# Patient Record
Sex: MALE | Race: WHITE | Employment: OTHER | URBAN - METROPOLITAN AREA
[De-identification: names, ages, dates, MRNs, and addresses within clinical notes are randomized per-mention and may not be internally consistent; named-entity substitution may affect disease eponyms.]

---

## 2023-07-28 ENCOUNTER — HOSPITAL ENCOUNTER (OUTPATIENT)
Facility: HOSPITAL | Age: 78
End: 2023-07-28
Payer: MEDICARE

## 2023-07-28 VITALS — WEIGHT: 170.7 LBS | BODY MASS INDEX: 28.44 KG/M2 | HEIGHT: 65 IN

## 2023-07-28 DIAGNOSIS — R31.0 GROSS HEMATURIA: ICD-10-CM

## 2023-07-28 LAB
ALBUMIN SERPL-MCNC: 3.6 G/DL (ref 3.4–5)
ALBUMIN/GLOB SERPL: 1.2 (ref 0.8–1.7)
ALP SERPL-CCNC: 87 U/L (ref 45–117)
ALT SERPL-CCNC: 25 U/L (ref 16–61)
ANION GAP SERPL CALC-SCNC: 4 MMOL/L (ref 3–18)
AST SERPL-CCNC: 13 U/L (ref 10–38)
BILIRUB SERPL-MCNC: 0.4 MG/DL (ref 0.2–1)
BUN SERPL-MCNC: 15 MG/DL (ref 7–18)
BUN/CREAT SERPL: 17 (ref 12–20)
CALCIUM SERPL-MCNC: 9.1 MG/DL (ref 8.5–10.1)
CHLORIDE SERPL-SCNC: 97 MMOL/L (ref 100–111)
CO2 SERPL-SCNC: 27 MMOL/L (ref 21–32)
CREAT SERPL-MCNC: 0.88 MG/DL (ref 0.6–1.3)
ERYTHROCYTE [DISTWIDTH] IN BLOOD BY AUTOMATED COUNT: 13.1 % (ref 11.6–14.5)
GLOBULIN SER CALC-MCNC: 2.9 G/DL (ref 2–4)
GLUCOSE SERPL-MCNC: 103 MG/DL (ref 74–99)
HCT VFR BLD AUTO: 41 % (ref 36–48)
HGB BLD-MCNC: 13.9 G/DL (ref 13–16)
MCH RBC QN AUTO: 30.8 PG (ref 24–34)
MCHC RBC AUTO-ENTMCNC: 33.9 G/DL (ref 31–37)
MCV RBC AUTO: 90.9 FL (ref 78–100)
NRBC # BLD: 0 K/UL (ref 0–0.01)
NRBC BLD-RTO: 0 PER 100 WBC
PLATELET # BLD AUTO: 291 K/UL (ref 135–420)
PMV BLD AUTO: 8.3 FL (ref 9.2–11.8)
POTASSIUM SERPL-SCNC: 4.5 MMOL/L (ref 3.5–5.5)
PROT SERPL-MCNC: 6.5 G/DL (ref 6.4–8.2)
RBC # BLD AUTO: 4.51 M/UL (ref 4.35–5.65)
SODIUM SERPL-SCNC: 128 MMOL/L (ref 136–145)
WBC # BLD AUTO: 10.3 K/UL (ref 4.6–13.2)

## 2023-07-28 PROCEDURE — 93005 ELECTROCARDIOGRAM TRACING: CPT | Performed by: UROLOGY

## 2023-07-28 PROCEDURE — 80053 COMPREHEN METABOLIC PANEL: CPT

## 2023-07-28 PROCEDURE — 36415 COLL VENOUS BLD VENIPUNCTURE: CPT

## 2023-07-28 PROCEDURE — 85027 COMPLETE CBC AUTOMATED: CPT

## 2023-07-29 LAB
EKG ATRIAL RATE: 81 BPM
EKG DIAGNOSIS: NORMAL
EKG P AXIS: 66 DEGREES
EKG P-R INTERVAL: 182 MS
EKG Q-T INTERVAL: 384 MS
EKG QRS DURATION: 92 MS
EKG QTC CALCULATION (BAZETT): 446 MS
EKG R AXIS: 11 DEGREES
EKG T AXIS: 81 DEGREES
EKG VENTRICULAR RATE: 81 BPM

## 2023-08-02 ENCOUNTER — HOSPITAL ENCOUNTER (OUTPATIENT)
Facility: HOSPITAL | Age: 78
Setting detail: OUTPATIENT SURGERY
Discharge: HOME | End: 2023-08-02
Attending: UROLOGY | Admitting: UROLOGY
Payer: MEDICARE

## 2023-08-02 VITALS
SYSTOLIC BLOOD PRESSURE: 192 MMHG | RESPIRATION RATE: 16 BRPM | WEIGHT: 170.3 LBS | TEMPERATURE: 98.5 F | OXYGEN SATURATION: 99 % | HEART RATE: 51 BPM | BODY MASS INDEX: 27.37 KG/M2 | HEIGHT: 66 IN | DIASTOLIC BLOOD PRESSURE: 107 MMHG

## 2023-08-02 DIAGNOSIS — Z01.818 PRE-OP TESTING: Primary | ICD-10-CM

## 2023-08-02 LAB
ANION GAP SERPL CALC-SCNC: 6 MMOL/L (ref 3–18)
BUN SERPL-MCNC: 12 MG/DL (ref 7–18)
BUN/CREAT SERPL: 15 (ref 12–20)
CALCIUM SERPL-MCNC: 9.2 MG/DL (ref 8.5–10.1)
CHLORIDE SERPL-SCNC: 102 MMOL/L (ref 100–111)
CO2 SERPL-SCNC: 28 MMOL/L (ref 21–32)
CREAT SERPL-MCNC: 0.8 MG/DL (ref 0.6–1.3)
GLUCOSE SERPL-MCNC: 117 MG/DL (ref 74–99)
POTASSIUM SERPL-SCNC: 4.5 MMOL/L (ref 3.5–5.5)
SODIUM SERPL-SCNC: 136 MMOL/L (ref 136–145)

## 2023-08-02 PROCEDURE — 80048 BASIC METABOLIC PNL TOTAL CA: CPT

## 2023-08-02 PROCEDURE — 36415 COLL VENOUS BLD VENIPUNCTURE: CPT

## 2023-08-02 RX ORDER — LEVOFLOXACIN 5 MG/ML
500 INJECTION, SOLUTION INTRAVENOUS
Status: DISCONTINUED | OUTPATIENT
Start: 2023-08-02 | End: 2023-08-02 | Stop reason: HOSPADM

## 2023-08-02 RX ORDER — SODIUM CHLORIDE, SODIUM LACTATE, POTASSIUM CHLORIDE, CALCIUM CHLORIDE 600; 310; 30; 20 MG/100ML; MG/100ML; MG/100ML; MG/100ML
INJECTION, SOLUTION INTRAVENOUS CONTINUOUS
Status: CANCELLED | OUTPATIENT
Start: 2023-08-02

## 2023-08-02 RX ORDER — SODIUM CHLORIDE, SODIUM LACTATE, POTASSIUM CHLORIDE, CALCIUM CHLORIDE 600; 310; 30; 20 MG/100ML; MG/100ML; MG/100ML; MG/100ML
INJECTION, SOLUTION INTRAVENOUS CONTINUOUS
Status: DISCONTINUED | OUTPATIENT
Start: 2023-08-02 | End: 2023-08-02 | Stop reason: HOSPADM

## 2023-08-02 RX ORDER — SODIUM CHLORIDE 0.9 % (FLUSH) 0.9 %
5-40 SYRINGE (ML) INJECTION PRN
Status: CANCELLED | OUTPATIENT
Start: 2023-08-02

## 2023-08-02 RX ORDER — DIPHENHYDRAMINE HYDROCHLORIDE 50 MG/ML
12.5 INJECTION INTRAMUSCULAR; INTRAVENOUS
Status: CANCELLED | OUTPATIENT
Start: 2023-08-02 | End: 2023-08-03

## 2023-08-02 RX ORDER — SODIUM CHLORIDE 0.9 % (FLUSH) 0.9 %
5-40 SYRINGE (ML) INJECTION EVERY 12 HOURS SCHEDULED
Status: CANCELLED | OUTPATIENT
Start: 2023-08-02

## 2023-08-02 RX ORDER — MIDAZOLAM HYDROCHLORIDE 2 MG/2ML
2 INJECTION, SOLUTION INTRAMUSCULAR; INTRAVENOUS
Status: CANCELLED | OUTPATIENT
Start: 2023-08-02 | End: 2023-08-03

## 2023-08-02 RX ORDER — ONDANSETRON 2 MG/ML
4 INJECTION INTRAMUSCULAR; INTRAVENOUS
Status: CANCELLED | OUTPATIENT
Start: 2023-08-02 | End: 2023-08-03

## 2023-08-02 RX ORDER — DROPERIDOL 2.5 MG/ML
0.62 INJECTION, SOLUTION INTRAMUSCULAR; INTRAVENOUS
Status: CANCELLED | OUTPATIENT
Start: 2023-08-02 | End: 2023-08-03

## 2023-08-02 RX ORDER — SODIUM CHLORIDE 9 MG/ML
INJECTION, SOLUTION INTRAVENOUS PRN
Status: CANCELLED | OUTPATIENT
Start: 2023-08-02

## 2023-08-02 RX ORDER — FENTANYL CITRATE 50 UG/ML
25 INJECTION, SOLUTION INTRAMUSCULAR; INTRAVENOUS EVERY 5 MIN PRN
Status: CANCELLED | OUTPATIENT
Start: 2023-08-02

## 2023-08-02 RX ORDER — HYDROMORPHONE HYDROCHLORIDE 1 MG/ML
0.25 INJECTION, SOLUTION INTRAMUSCULAR; INTRAVENOUS; SUBCUTANEOUS EVERY 5 MIN PRN
Status: CANCELLED | OUTPATIENT
Start: 2023-08-02

## 2023-08-02 RX ORDER — LABETALOL HYDROCHLORIDE 5 MG/ML
10 INJECTION, SOLUTION INTRAVENOUS
Status: CANCELLED | OUTPATIENT
Start: 2023-08-02

## 2023-08-02 ASSESSMENT — PAIN - FUNCTIONAL ASSESSMENT: PAIN_FUNCTIONAL_ASSESSMENT: 0-10

## 2023-08-02 NOTE — H&P
Date    ascorbic acid (vitamin C) 500 mg tablet    N          B Complex Plus Vitamin C 15 mg-10 mg-50 mg-5 mg-300 mg capsule    N          cod liver oil capsule    N          famotidine 20 mg tablet  take 1 tablet by oral route 2 times every day  N          Flomax  take 1 capsule by oral route  every day 1/2 hour following the same meal each day  N          Flonase Allergy Relief 50 mcg/actuation nasal spray,suspension  spray 1 - 2 spray by intranasal route  every day in each nostril as needed  N        03/21/2023  levothyroxine 125 mcg tablet  take 1 tablet by oral route  every day on an empty stomach 30 minutes before breakfast  N          loratadine 10 mg tablet  take 1 tablet by oral route  every day  N          losartan 50 mg-hydrochlorothiazide 12.5 mg tablet  take 1 tablet by oral route  every day  N          tramadol 50 mg tablet  take 1 tablet by oral route  every 6 hours as needed  N          Vitamin D3 50 mcg (2,000 unit) capsule    N          vitamin E 400 unit capsule    N          zinc 50 mg tablet    N            Active Patient Care Team Members  Name  Contact  Agency Type  Support Role  Relationship  Active Date  Inactive Date  Specialty  Paulina Deutsch      Patient provider  PCP

## 2023-08-02 NOTE — PERIOP NOTE
Spoke with Dr. Pietro Goltz to notify him the patients sodium is normal but his blood pressures are elevated. States he is going to speak with Dr. Anjali Man and get back to me. [facility] called and stated Dr. Anjali Man and Dr. Pietro Goltz stated he needs to follow up with his PCP in regards to his blood pressure and have it better controlled for surgery. Call doctors office to reschedule.

## 2023-08-02 NOTE — PERIOP NOTE
Spoke with Dr. Ronit Eric as patients Sodium was 128 on labs pre surgery. Stated to repeat today.  Order placed,

## 2023-08-18 NOTE — PROGRESS NOTES
Requested a [employer] with [facility]. Surgery 09/06/23 Dr. Priyanka Camejo. Faxed back to [facility] his Surgery Scheduler.

## 2023-09-05 ENCOUNTER — ANESTHESIA EVENT (OUTPATIENT)
Facility: HOSPITAL | Age: 78
End: 2023-09-05
Payer: MEDICARE

## 2023-09-06 ENCOUNTER — ANESTHESIA (OUTPATIENT)
Facility: HOSPITAL | Age: 78
End: 2023-09-06
Payer: MEDICARE

## 2023-09-06 ENCOUNTER — HOSPITAL ENCOUNTER (OUTPATIENT)
Facility: HOSPITAL | Age: 78
Setting detail: OUTPATIENT SURGERY
Discharge: HOME | End: 2023-09-06
Attending: UROLOGY | Admitting: UROLOGY
Payer: MEDICARE

## 2023-09-06 VITALS
RESPIRATION RATE: 16 BRPM | TEMPERATURE: 97.6 F | WEIGHT: 167 LBS | HEART RATE: 87 BPM | HEIGHT: 66 IN | OXYGEN SATURATION: 98 % | DIASTOLIC BLOOD PRESSURE: 81 MMHG | BODY MASS INDEX: 26.84 KG/M2 | SYSTOLIC BLOOD PRESSURE: 149 MMHG

## 2023-09-06 DIAGNOSIS — N32.89 BLADDER MASS: Primary | ICD-10-CM

## 2023-09-06 PROCEDURE — 7100000000 HC PACU RECOVERY - FIRST 15 MIN: Performed by: UROLOGY

## 2023-09-06 PROCEDURE — 3600000012 HC SURGERY LEVEL 2 ADDTL 15MIN: Performed by: UROLOGY

## 2023-09-06 PROCEDURE — 2720000010 HC SURG SUPPLY STERILE: Performed by: UROLOGY

## 2023-09-06 PROCEDURE — 2709999900 HC NON-CHARGEABLE SUPPLY: Performed by: UROLOGY

## 2023-09-06 PROCEDURE — 7100000001 HC PACU RECOVERY - ADDTL 15 MIN: Performed by: UROLOGY

## 2023-09-06 PROCEDURE — 88307 TISSUE EXAM BY PATHOLOGIST: CPT

## 2023-09-06 PROCEDURE — 6360000002 HC RX W HCPCS: Performed by: NURSE ANESTHETIST, CERTIFIED REGISTERED

## 2023-09-06 PROCEDURE — 3700000001 HC ADD 15 MINUTES (ANESTHESIA): Performed by: UROLOGY

## 2023-09-06 PROCEDURE — 2500000003 HC RX 250 WO HCPCS: Performed by: NURSE ANESTHETIST, CERTIFIED REGISTERED

## 2023-09-06 PROCEDURE — 7100000010 HC PHASE II RECOVERY - FIRST 15 MIN: Performed by: UROLOGY

## 2023-09-06 PROCEDURE — 7100000011 HC PHASE II RECOVERY - ADDTL 15 MIN: Performed by: UROLOGY

## 2023-09-06 PROCEDURE — C9399 UNCLASSIFIED DRUGS OR BIOLOG: HCPCS | Performed by: NURSE ANESTHETIST, CERTIFIED REGISTERED

## 2023-09-06 PROCEDURE — 3700000000 HC ANESTHESIA ATTENDED CARE: Performed by: UROLOGY

## 2023-09-06 PROCEDURE — 6360000002 HC RX W HCPCS: Performed by: UROLOGY

## 2023-09-06 PROCEDURE — 2580000003 HC RX 258: Performed by: UROLOGY

## 2023-09-06 PROCEDURE — 88309 TISSUE EXAM BY PATHOLOGIST: CPT

## 2023-09-06 PROCEDURE — 3600000002 HC SURGERY LEVEL 2 BASE: Performed by: UROLOGY

## 2023-09-06 RX ORDER — SODIUM CHLORIDE 0.9 % (FLUSH) 0.9 %
5-40 SYRINGE (ML) INJECTION PRN
Status: DISCONTINUED | OUTPATIENT
Start: 2023-09-06 | End: 2023-09-06 | Stop reason: HOSPADM

## 2023-09-06 RX ORDER — PROPOFOL 10 MG/ML
INJECTION, EMULSION INTRAVENOUS PRN
Status: DISCONTINUED | OUTPATIENT
Start: 2023-09-06 | End: 2023-09-06 | Stop reason: SDUPTHER

## 2023-09-06 RX ORDER — DOCUSATE SODIUM 100 MG/1
100 CAPSULE, LIQUID FILLED ORAL 2 TIMES DAILY
Qty: 20 CAPSULE | Refills: 0 | Status: SHIPPED | OUTPATIENT
Start: 2023-09-06 | End: 2023-09-16

## 2023-09-06 RX ORDER — LEVOFLOXACIN 5 MG/ML
500 INJECTION, SOLUTION INTRAVENOUS
Status: COMPLETED | OUTPATIENT
Start: 2023-09-06 | End: 2023-09-06

## 2023-09-06 RX ORDER — SODIUM CHLORIDE 9 MG/ML
INJECTION, SOLUTION INTRAVENOUS PRN
Status: DISCONTINUED | OUTPATIENT
Start: 2023-09-06 | End: 2023-09-06 | Stop reason: HOSPADM

## 2023-09-06 RX ORDER — LEVOFLOXACIN 500 MG/1
500 TABLET, FILM COATED ORAL DAILY
Qty: 3 TABLET | Refills: 0 | Status: SHIPPED | OUTPATIENT
Start: 2023-09-06 | End: 2023-09-09

## 2023-09-06 RX ORDER — HYDROMORPHONE HYDROCHLORIDE 1 MG/ML
0.5 INJECTION, SOLUTION INTRAMUSCULAR; INTRAVENOUS; SUBCUTANEOUS EVERY 5 MIN PRN
Status: DISCONTINUED | OUTPATIENT
Start: 2023-09-06 | End: 2023-09-06 | Stop reason: HOSPADM

## 2023-09-06 RX ORDER — ROCURONIUM BROMIDE 10 MG/ML
INJECTION, SOLUTION INTRAVENOUS PRN
Status: DISCONTINUED | OUTPATIENT
Start: 2023-09-06 | End: 2023-09-06 | Stop reason: SDUPTHER

## 2023-09-06 RX ORDER — FENTANYL CITRATE 50 UG/ML
25 INJECTION, SOLUTION INTRAMUSCULAR; INTRAVENOUS EVERY 5 MIN PRN
Status: DISCONTINUED | OUTPATIENT
Start: 2023-09-06 | End: 2023-09-06 | Stop reason: HOSPADM

## 2023-09-06 RX ORDER — SODIUM CHLORIDE, SODIUM LACTATE, POTASSIUM CHLORIDE, CALCIUM CHLORIDE 600; 310; 30; 20 MG/100ML; MG/100ML; MG/100ML; MG/100ML
INJECTION, SOLUTION INTRAVENOUS CONTINUOUS
Status: DISCONTINUED | OUTPATIENT
Start: 2023-09-06 | End: 2023-09-06 | Stop reason: HOSPADM

## 2023-09-06 RX ORDER — IPRATROPIUM BROMIDE AND ALBUTEROL SULFATE 2.5; .5 MG/3ML; MG/3ML
1 SOLUTION RESPIRATORY (INHALATION)
Status: DISCONTINUED | OUTPATIENT
Start: 2023-09-06 | End: 2023-09-06 | Stop reason: HOSPADM

## 2023-09-06 RX ORDER — SODIUM CHLORIDE 0.9 % (FLUSH) 0.9 %
5-40 SYRINGE (ML) INJECTION EVERY 12 HOURS SCHEDULED
Status: DISCONTINUED | OUTPATIENT
Start: 2023-09-06 | End: 2023-09-06 | Stop reason: HOSPADM

## 2023-09-06 RX ORDER — FENTANYL CITRATE 50 UG/ML
INJECTION, SOLUTION INTRAMUSCULAR; INTRAVENOUS PRN
Status: DISCONTINUED | OUTPATIENT
Start: 2023-09-06 | End: 2023-09-06 | Stop reason: SDUPTHER

## 2023-09-06 RX ORDER — DEXAMETHASONE SODIUM PHOSPHATE 4 MG/ML
INJECTION, SOLUTION INTRA-ARTICULAR; INTRALESIONAL; INTRAMUSCULAR; INTRAVENOUS; SOFT TISSUE PRN
Status: DISCONTINUED | OUTPATIENT
Start: 2023-09-06 | End: 2023-09-06 | Stop reason: SDUPTHER

## 2023-09-06 RX ORDER — EPHEDRINE SULFATE/0.9% NACL/PF 50 MG/5 ML
SYRINGE (ML) INTRAVENOUS PRN
Status: DISCONTINUED | OUTPATIENT
Start: 2023-09-06 | End: 2023-09-06 | Stop reason: SDUPTHER

## 2023-09-06 RX ORDER — ONDANSETRON 2 MG/ML
INJECTION INTRAMUSCULAR; INTRAVENOUS PRN
Status: DISCONTINUED | OUTPATIENT
Start: 2023-09-06 | End: 2023-09-06 | Stop reason: SDUPTHER

## 2023-09-06 RX ORDER — PROCHLORPERAZINE EDISYLATE 5 MG/ML
5 INJECTION INTRAMUSCULAR; INTRAVENOUS
Status: DISCONTINUED | OUTPATIENT
Start: 2023-09-06 | End: 2023-09-06 | Stop reason: HOSPADM

## 2023-09-06 RX ORDER — OXYCODONE HYDROCHLORIDE AND ACETAMINOPHEN 5; 325 MG/1; MG/1
1 TABLET ORAL EVERY 6 HOURS PRN
Qty: 20 TABLET | Refills: 0 | Status: SHIPPED | OUTPATIENT
Start: 2023-09-06 | End: 2023-09-11

## 2023-09-06 RX ORDER — LIDOCAINE HYDROCHLORIDE 20 MG/ML
INJECTION, SOLUTION INTRAVENOUS PRN
Status: DISCONTINUED | OUTPATIENT
Start: 2023-09-06 | End: 2023-09-06 | Stop reason: SDUPTHER

## 2023-09-06 RX ORDER — ACETAMINOPHEN 500 MG
1000 TABLET ORAL EVERY 6 HOURS PRN
COMMUNITY

## 2023-09-06 RX ORDER — PHENAZOPYRIDINE HYDROCHLORIDE 200 MG/1
200 TABLET, FILM COATED ORAL
Qty: 15 TABLET | Refills: 0 | Status: SHIPPED | OUTPATIENT
Start: 2023-09-06 | End: 2023-09-11

## 2023-09-06 RX ADMIN — Medication 10 MG: at 12:20

## 2023-09-06 RX ADMIN — LEVOFLOXACIN 500 MG: 5 INJECTION, SOLUTION INTRAVENOUS at 11:27

## 2023-09-06 RX ADMIN — Medication 5 MG: at 11:50

## 2023-09-06 RX ADMIN — LIDOCAINE HYDROCHLORIDE 100 MG: 20 INJECTION, SOLUTION INTRAVENOUS at 11:30

## 2023-09-06 RX ADMIN — Medication 5 MG: at 12:10

## 2023-09-06 RX ADMIN — Medication 10 MG: at 12:13

## 2023-09-06 RX ADMIN — Medication 5 MG: at 12:02

## 2023-09-06 RX ADMIN — Medication 10 MG: at 11:59

## 2023-09-06 RX ADMIN — ROCURONIUM BROMIDE 5 MG: 10 INJECTION, SOLUTION INTRAVENOUS at 12:27

## 2023-09-06 RX ADMIN — FENTANYL CITRATE 25 MCG: 50 INJECTION, SOLUTION INTRAMUSCULAR; INTRAVENOUS at 11:30

## 2023-09-06 RX ADMIN — FENTANYL CITRATE 25 MCG: 50 INJECTION, SOLUTION INTRAMUSCULAR; INTRAVENOUS at 12:07

## 2023-09-06 RX ADMIN — SODIUM CHLORIDE, SODIUM LACTATE, POTASSIUM CHLORIDE, AND CALCIUM CHLORIDE: 600; 310; 30; 20 INJECTION, SOLUTION INTRAVENOUS at 11:27

## 2023-09-06 RX ADMIN — PROPOFOL 130 MG: 10 INJECTION, EMULSION INTRAVENOUS at 11:32

## 2023-09-06 RX ADMIN — Medication 5 MG: at 11:58

## 2023-09-06 RX ADMIN — Medication 10 MG: at 11:46

## 2023-09-06 RX ADMIN — SUGAMMADEX 100 MG: 100 INJECTION, SOLUTION INTRAVENOUS at 12:56

## 2023-09-06 RX ADMIN — FENTANYL CITRATE 25 MCG: 50 INJECTION, SOLUTION INTRAMUSCULAR; INTRAVENOUS at 11:41

## 2023-09-06 RX ADMIN — FENTANYL CITRATE 25 MCG: 50 INJECTION, SOLUTION INTRAMUSCULAR; INTRAVENOUS at 11:53

## 2023-09-06 RX ADMIN — DEXAMETHASONE SODIUM PHOSPHATE 4 MG: 4 INJECTION INTRA-ARTICULAR; INTRALESIONAL; INTRAMUSCULAR; INTRAVENOUS; SOFT TISSUE at 11:51

## 2023-09-06 RX ADMIN — ONDANSETRON 4 MG: 2 INJECTION INTRAMUSCULAR; INTRAVENOUS at 12:55

## 2023-09-06 RX ADMIN — ROCURONIUM BROMIDE 10 MG: 10 INJECTION, SOLUTION INTRAVENOUS at 11:52

## 2023-09-06 RX ADMIN — ROCURONIUM BROMIDE 5 MG: 10 INJECTION, SOLUTION INTRAVENOUS at 12:28

## 2023-09-06 ASSESSMENT — PAIN - FUNCTIONAL ASSESSMENT: PAIN_FUNCTIONAL_ASSESSMENT: 0-10

## 2023-09-06 ASSESSMENT — PAIN SCALES - GENERAL
PAINLEVEL_OUTOF10: 0
PAINLEVEL_OUTOF10: 0

## 2023-09-06 ASSESSMENT — COPD QUESTIONNAIRES: CAT_SEVERITY: MILD

## 2023-09-06 ASSESSMENT — LIFESTYLE VARIABLES: SMOKING_STATUS: 0

## 2023-09-06 NOTE — ANESTHESIA POSTPROCEDURE EVALUATION
Department of Anesthesiology  Postprocedure Note    Patient: Fredy Molina  MRN: [id number]  YOB: 1945  Date of evaluation: 9/6/2023      Procedure Summary     Date: 09/06/23 Room / Location: [address] [place] [address] MAIN OR    Anesthesia Start: 1126 Anesthesia Stop: 1306    Procedure: TRANSURETHRAL RESECTION OF BLADDER TUMOR (LARGE) (Bladder) Diagnosis:       Gross hematuria      (Gross hematuria [R31.0])    Surgeons: Annie Tracy MD Responsible Provider: Karrie Herrera MD    Anesthesia Type: General ASA Status: 2          Anesthesia Type: General    Anshul Phase I: Anshul Score: 10    Anshul Phase II: Anshul Score: 10      Anesthesia Post Evaluation    Patient location during evaluation: PACU  Patient participation: complete - patient participated  Level of consciousness: awake and alert  Pain score: 0  Airway patency: patent  Nausea & Vomiting: no nausea and no vomiting  Complications: no  Cardiovascular status: blood pressure returned to baseline  Respiratory status: acceptable  Hydration status: euvolemic

## 2023-09-06 NOTE — PERIOP NOTE
TRANSFER - IN REPORT:    Verbal report received from OR RN on [employer]  being received OR from  for routine progression of patient care      Report consisted of patient's Situation, Background, Assessment and   Recommendations(SBAR). Information from the following report(s) Adult Overview, Surgery Report, Intake/Output, and MAR was reviewed with the receiving nurse. Opportunity for questions and clarification was provided. Assessment completed upon patient's arrival to unit and care assumed.

## 2023-09-06 NOTE — ANESTHESIA PRE PROCEDURE
Department of Anesthesiology  Preprocedure Note       Name:  Irena Mayen   Age:  77 y.o.  :  1945                                          MRN:  [id number]         Date:  2023      Surgeon: Willy Roque):  Doris Doyle MD    Procedure: Procedure(s):  TRANSURETRHAL RESECTIONOF BLADDER TUMOR, TRANSURETHRAL RESECTION OF THE PROSTATE    Medications prior to admission:   Prior to Admission medications    Medication Sig Start Date End Date Taking? Authorizing Provider   acetaminophen (TYLENOL) 500 MG tablet Take 2 tablets by mouth every 6 hours as needed for Pain   Yes Historical Provider, MD   zinc 50 MG TABS tablet Take 1 tablet by mouth daily    Historical Provider, MD   cod liver oil CAPS Take 1 capsule by mouth daily 415 mg    Historical Provider, MD   amLODIPine (NORVASC) 5 MG tablet Take 1 tablet by mouth every morning Indications: HTN    Historical Provider, MD   ALPRAZolam (XANAX) 0.25 MG tablet Take 1 tablet by mouth daily as needed for Anxiety. Historical Provider, MD   zolpidem (AMBIEN) 5 MG tablet Take 1 tablet by mouth nightly as needed for Sleep. Historical Provider, MD   levothyroxine (SYNTHROID) 125 MCG tablet Take 1 tablet by mouth Daily Indications: hashimoto    Historical Provider, MD   losartan (COZAAR) 50 MG tablet Take 1 tablet by mouth every morning Indications: HTN    Historical Provider, MD   fluticasone-salmeterol (ADVAIR) 500-50 MCG/ACT AEPB diskus inhaler Inhale 1 puff into the lungs in the morning and 1 puff in the evening. Indications: COPD. Historical Provider, MD   traMADol (ULTRAM) 50 MG tablet Take 1 tablet by mouth 2 times daily as needed.     Historical Provider, MD   tamsulosin (FLOMAX) 0.4 MG capsule Take 1 capsule by mouth every evening After dinnner    Historical Provider, MD   famotidine (PEPCID) 20 MG tablet Take 1 tablet by mouth nightly Indications: Reflux    Historical Provider, MD   loratadine (CLARITIN) 10 MG tablet Take 1 tablet by mouth nightly

## 2023-09-06 NOTE — PERIOP NOTE
Discharge instructions reviewed with patient and family. Opportunity for questions and answers given. No further questions at this time.    Pt and wife instructed on martinez care - verbalized understanding

## 2023-09-06 NOTE — PERIOP NOTE
Reviewed PTA medication list with patient/caregiver and patient/caregiver denies any additional medications. Patient admits to having a responsible adult care for them at home for at least 24 hours after surgery. Patient encouraged to use gown warming system and informed that using said warming gown to regulate body temperature prior to a procedure has been shown to help reduce the risks of blood clots and infection. Patient's pharmacy of choice verified and documented in PTA medication section. Dual skin assessment & fall risk band verification completed with Paula RICARDO.

## 2023-09-06 NOTE — PERIOP NOTE
Dr. Yolanda Blanchard at bedside. Placed traction, can be removed once in Phase 2. Pt to go home with martinez, will be removed on Monday or Tuesday.  Red clear urine as expected per Dr. Yolanda Blanchard.

## 2023-09-06 NOTE — OP NOTE
Operative Note      Patient: Ab Raygoza  YOB: 1945  MRN: [id number]    Date of Procedure: 9/6/2023    Pre-Op Diagnosis Codes:     * Gross hematuria [R31.0]  Bladder mass suspicious for malignancy    Post-Op Diagnosis: Same       Procedure(s):  TRANSURETHRAL RESECTION OF BLADDER TUMOR (LARGE)    Surgeon(s):  Doris Doyle MD    Assistant:   * No surgical staff found *    Anesthesia: General    Estimated Blood Loss (mL): Minimal    Complications: None    Specimens:   ID Type Source Tests Collected by Time Destination   A : BLADDER TUMOR- SUPERFICIAL TRIGONE BLADDER Tissue Bladder SURGICAL PATHOLOGY Doris Doyle MD 9/6/2023 1206    B : BLADDER TUMOR- DEEP RESECTION Tissue Bladder SURGICAL PATHOLOGY Doris Doyle MD 9/6/2023 1246        Implants:  * No implants in log *      Drains:   Urinary Catheter 09/06/23 Coude (Active)   $ Urethral catheter insertion Inserted for procedure 09/06/23 1445   Catheter Indications Perioperative use for selected surgical procedures 09/06/23 1445   Site Assessment Pink 09/06/23 1445   Urine Color Pink 09/06/23 1445   Urine Appearance Clear 09/06/23 1445   Collection Container Standard 09/06/23 1308   Securement Method Securing device (Describe) 09/06/23 1308   Catheter Best Practices  Drainage tube clipped to bed;Catheter secured to thigh; Tamper seal intact; Bag below bladder;Bag not on floor; Lack of dependent loop in tubing;Drainage bag less than half full 09/06/23 1308   Status Draining 09/06/23 1308   Output (mL) 800 mL 09/06/23 1445       Findings: Very large well circumscribed large tumor a the trigone of the bladder with involvement of the right and left UO area without aniceto obstruction. Able to resect the mass completely including resecting back the bilateral UO s. They were patent after the resection. Detailed Description of Procedure:      On the date of operation, the patient signed consent after we explained the risks, benefits, and alternatives of surgical intervention. The procedure was explained to him and all questions were answered in the preoperative area. He was then brought to the operative theater and placed on the table in the supine position with subsequent administration of a general anesthesia. A timeout was performed and administration of preoperative antibiotics. When the patient was sufficiently anesthetized, he was moved into dorsal lithotomy position, prepped and draped in the normal sterile fashion. We then inserted a resectoscopic sheath with a visual obturator and 30 degree cystoscopic lens and surveyed the bladder, which revealed tumors as stated above. The bipolar resectoscopic working element with loop was used to resect the bladder lesion completely. Bilateral ureteral orifices involved as in findings. Bleeding was well controlled at this point. The mass was resected to allow the tissue fragments to evacuate and sent off the field. We then returned to the bladder to assess hemostasis once again and verified that bleeding was well-controlled. 18 fr martinez cath was placed in the bladder. Patient was then extubated after returning to the supine position and transferred to the PACU in stable condition.            Electronically signed by Bj Villa MD on 9/6/2023 at 4:23 PM

## 2023-09-06 NOTE — PERIOP NOTE
TRANSFER - OUT REPORT:    Verbal report given to [facility] RN on [employer]  being transferred to [facility] for routine progression of patient care       Report consisted of patient's Situation, Background, Assessment and   Recommendations(SBAR). Information from the following report(s) Adult Overview, Surgery Report, Intake/Output, and MAR was reviewed with the receiving nurse. Lines:   Peripheral IV 09/06/23 Right Forearm (Active)   Site Assessment Clean, dry & intact 09/06/23 1319   Line Status Infusing 09/06/23 1319   Phlebitis Assessment No symptoms 09/06/23 1319   Infiltration Assessment 0 09/06/23 1319   Dressing Status Clean, dry & intact 09/06/23 1319   Dressing Type Transparent 09/06/23 1319        Opportunity for questions and clarification was provided.       Patient transported with:  [id number]

## 2023-09-06 NOTE — H&P
Urology [address] [facility],[address] [id number]  Tel: [phone]  Fax: [phone]    Patient :  Jessica Cobb  YOB: 1945  Birth Sex:  Male  Current Gender:  Male  Date:   9/6/2023  Visit Type:    Office Visit  Assessment/Plan  #  Detail Type  Description   1. 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 Patient Plan  persistent gross hematuria. Office cystoscopy today shows patient to have a suspicious mass at the trigone of the bladder suspicious for possible friable median lobe verses actual mass. He needs intervention to address this. He will need TURBT/TURP. Discussed with patient and wife today. Risk benefits reviewed. All patient procedure. Potential need for Gay catheter. Based on the findings on pathology then will determine if any additional treatment may be necessary. He will undergo PAT per hospital protocol. 2.  Assessment  Gross hematuria (R31.0). Patient Plan  persistent gross hematuria. Office cystoscopy today shows patient to have a suspicious mass at the trigone of the bladder suspicious for possible friable median lobe verses actual mass. He needs intervention to address this. He will need TURBT/TURP. Discussed with patient and wife today. Risk benefits reviewed. All patient procedure. Potential need for Gay catheter. Based on the findings on pathology then will determine if any additional treatment may be necessary. He will undergo PAT per hospital protocol. 3.  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 Patient Plan  Can increase Flomax 0.4 mg to b.i.d. to help with bladder emptying. 4.  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 5.  Assessment  Hashimoto's thyroiditis (E06.3). 6.  Assessment  High blood pressure (I10).               Additional Visit Information      This 77year old male presents for Hematuria. History of Present Illness  1. Hematuria   The patient presents with hematuria (gross). The problem began gradually. The problem has improved. The patient denies pain. Pertinent history includes being at least 35years of age but not daily aspirin use, history of irritative voiding symptoms, prior history of stones, history of prostate cancer, history of UTIs, family history of bladder cancer, family history of renal cancer, family history of stones, prior prostate surgeries and smoking or urolithiasis. Denies aggravating factors. Denies relieving factors. The patient also complains of nocturia (2 times a night). The patient denies chills, fever, nausea, urinary hesitancy and urinary incontinence. Additional information: 06/12/2023-patient referred by PCP for gross hematuria. Covid 3/2023. No anticoagulation. On flomax for BPH. Noted 5/2023 noted gross hematuria. 30 pack yr smoker. Comments: 6/20/2023 - patient recently seen for gross hematuria. He reports that it had improved until earlier this week. He reports intermittent retention due to blood clots. He is eventually able to urinate once the blood clots break up but is concerned. He is having a small amount of right groin pain. Denies dysuria or flank pain.    07/27/2023-persistent gross hematuria. Office cystoscopy today shows patient to have a suspicious mass at the trigone of the bladder suspicious for possible friable median lobe verses actual mass. CT urogram also reviewed findings 06/28/2023 3.8 x 3.8 x 3.8 cm posterior central multi lobulated mass projecting into the bladder lumen. He needs intervention to address this. He will need TURBT/TURP.         Problem List  Problem Description  Onset Date  Chronic  Clinical Status  Notes  Hashimoto's thyroiditis    N      Nontoxic diffuse thyroid goiter    N        Medications (active prior to today)  Medication  Instructions  Start Date  Stop Date  Refilled  Elsewhere  tramadol 50

## 2024-02-20 ENCOUNTER — ANESTHESIA EVENT (OUTPATIENT)
Facility: HOSPITAL | Age: 79
End: 2024-02-20
Payer: MEDICARE

## 2024-03-06 ENCOUNTER — HOSPITAL ENCOUNTER (OUTPATIENT)
Facility: HOSPITAL | Age: 79
Setting detail: OUTPATIENT SURGERY
Discharge: HOME | End: 2024-03-06
Attending: UROLOGY | Admitting: UROLOGY
Payer: MEDICARE

## 2024-03-06 ENCOUNTER — ANESTHESIA (OUTPATIENT)
Facility: HOSPITAL | Age: 79
End: 2024-03-06
Payer: MEDICARE

## 2024-03-06 VITALS
BODY MASS INDEX: 27.39 KG/M2 | HEIGHT: 65 IN | OXYGEN SATURATION: 100 % | RESPIRATION RATE: 16 BRPM | DIASTOLIC BLOOD PRESSURE: 80 MMHG | SYSTOLIC BLOOD PRESSURE: 152 MMHG | TEMPERATURE: 97 F | HEART RATE: 68 BPM | WEIGHT: 164.4 LBS

## 2024-03-06 DIAGNOSIS — N40.1 BPH WITH OBSTRUCTION/LOWER URINARY TRACT SYMPTOMS: Primary | ICD-10-CM

## 2024-03-06 DIAGNOSIS — N13.8 BPH WITH OBSTRUCTION/LOWER URINARY TRACT SYMPTOMS: Primary | ICD-10-CM

## 2024-03-06 PROCEDURE — 3600000002 HC SURGERY LEVEL 2 BASE: Performed by: UROLOGY

## 2024-03-06 PROCEDURE — 6360000002 HC RX W HCPCS: Performed by: NURSE ANESTHETIST, CERTIFIED REGISTERED

## 2024-03-06 PROCEDURE — 2500000003 HC RX 250 WO HCPCS: Performed by: NURSE ANESTHETIST, CERTIFIED REGISTERED

## 2024-03-06 PROCEDURE — 2580000003 HC RX 258: Performed by: UROLOGY

## 2024-03-06 PROCEDURE — 88305 TISSUE EXAM BY PATHOLOGIST: CPT

## 2024-03-06 PROCEDURE — 7100000001 HC PACU RECOVERY - ADDTL 15 MIN: Performed by: UROLOGY

## 2024-03-06 PROCEDURE — 3700000001 HC ADD 15 MINUTES (ANESTHESIA): Performed by: UROLOGY

## 2024-03-06 PROCEDURE — 2709999900 HC NON-CHARGEABLE SUPPLY: Performed by: UROLOGY

## 2024-03-06 PROCEDURE — 7100000000 HC PACU RECOVERY - FIRST 15 MIN: Performed by: UROLOGY

## 2024-03-06 PROCEDURE — 7100000011 HC PHASE II RECOVERY - ADDTL 15 MIN: Performed by: UROLOGY

## 2024-03-06 PROCEDURE — 7100000010 HC PHASE II RECOVERY - FIRST 15 MIN: Performed by: UROLOGY

## 2024-03-06 PROCEDURE — 3700000000 HC ANESTHESIA ATTENDED CARE: Performed by: UROLOGY

## 2024-03-06 PROCEDURE — 3600000012 HC SURGERY LEVEL 2 ADDTL 15MIN: Performed by: UROLOGY

## 2024-03-06 PROCEDURE — 6360000002 HC RX W HCPCS: Performed by: UROLOGY

## 2024-03-06 PROCEDURE — C2596 PROBE, ROBOTIC, WATER-JET: HCPCS | Performed by: UROLOGY

## 2024-03-06 RX ORDER — ROCURONIUM BROMIDE 10 MG/ML
INJECTION, SOLUTION INTRAVENOUS PRN
Status: DISCONTINUED | OUTPATIENT
Start: 2024-03-06 | End: 2024-03-06 | Stop reason: SDUPTHER

## 2024-03-06 RX ORDER — FENTANYL CITRATE 50 UG/ML
INJECTION, SOLUTION INTRAMUSCULAR; INTRAVENOUS PRN
Status: DISCONTINUED | OUTPATIENT
Start: 2024-03-06 | End: 2024-03-06 | Stop reason: SDUPTHER

## 2024-03-06 RX ORDER — IPRATROPIUM BROMIDE AND ALBUTEROL SULFATE 2.5; .5 MG/3ML; MG/3ML
1 SOLUTION RESPIRATORY (INHALATION)
Status: DISCONTINUED | OUTPATIENT
Start: 2024-03-06 | End: 2024-03-06 | Stop reason: HOSPADM

## 2024-03-06 RX ORDER — LIDOCAINE HYDROCHLORIDE 20 MG/ML
INJECTION, SOLUTION EPIDURAL; INFILTRATION; INTRACAUDAL; PERINEURAL PRN
Status: DISCONTINUED | OUTPATIENT
Start: 2024-03-06 | End: 2024-03-06 | Stop reason: SDUPTHER

## 2024-03-06 RX ORDER — ONDANSETRON 2 MG/ML
4 INJECTION INTRAMUSCULAR; INTRAVENOUS
Status: DISCONTINUED | OUTPATIENT
Start: 2024-03-06 | End: 2024-03-06 | Stop reason: HOSPADM

## 2024-03-06 RX ORDER — LEVOFLOXACIN 5 MG/ML
500 INJECTION, SOLUTION INTRAVENOUS
Status: COMPLETED | OUTPATIENT
Start: 2024-03-06 | End: 2024-03-06

## 2024-03-06 RX ORDER — OXYCODONE HYDROCHLORIDE 5 MG/1
5 TABLET ORAL
Status: DISCONTINUED | OUTPATIENT
Start: 2024-03-06 | End: 2024-03-06 | Stop reason: HOSPADM

## 2024-03-06 RX ORDER — MIDAZOLAM HYDROCHLORIDE 1 MG/ML
INJECTION INTRAMUSCULAR; INTRAVENOUS PRN
Status: DISCONTINUED | OUTPATIENT
Start: 2024-03-06 | End: 2024-03-06 | Stop reason: SDUPTHER

## 2024-03-06 RX ORDER — DOCUSATE SODIUM 100 MG/1
100 CAPSULE, LIQUID FILLED ORAL 2 TIMES DAILY
Qty: 20 CAPSULE | Refills: 0 | Status: SHIPPED | OUTPATIENT
Start: 2024-03-06 | End: 2024-03-16

## 2024-03-06 RX ORDER — DEXAMETHASONE SODIUM PHOSPHATE 4 MG/ML
INJECTION, SOLUTION INTRA-ARTICULAR; INTRALESIONAL; INTRAMUSCULAR; INTRAVENOUS; SOFT TISSUE PRN
Status: DISCONTINUED | OUTPATIENT
Start: 2024-03-06 | End: 2024-03-06 | Stop reason: SDUPTHER

## 2024-03-06 RX ORDER — NALOXONE HYDROCHLORIDE 0.4 MG/ML
INJECTION, SOLUTION INTRAMUSCULAR; INTRAVENOUS; SUBCUTANEOUS PRN
Status: DISCONTINUED | OUTPATIENT
Start: 2024-03-06 | End: 2024-03-06 | Stop reason: HOSPADM

## 2024-03-06 RX ORDER — SODIUM CHLORIDE 0.9 % (FLUSH) 0.9 %
5-40 SYRINGE (ML) INJECTION EVERY 12 HOURS SCHEDULED
Status: DISCONTINUED | OUTPATIENT
Start: 2024-03-06 | End: 2024-03-06 | Stop reason: HOSPADM

## 2024-03-06 RX ORDER — DIPHENHYDRAMINE HYDROCHLORIDE 50 MG/ML
12.5 INJECTION INTRAMUSCULAR; INTRAVENOUS
Status: DISCONTINUED | OUTPATIENT
Start: 2024-03-06 | End: 2024-03-06 | Stop reason: HOSPADM

## 2024-03-06 RX ORDER — EPHEDRINE SULFATE/0.9% NACL/PF 50 MG/5 ML
SYRINGE (ML) INTRAVENOUS PRN
Status: DISCONTINUED | OUTPATIENT
Start: 2024-03-06 | End: 2024-03-06 | Stop reason: SDUPTHER

## 2024-03-06 RX ORDER — GLYCOPYRROLATE 0.2 MG/ML
INJECTION INTRAMUSCULAR; INTRAVENOUS PRN
Status: DISCONTINUED | OUTPATIENT
Start: 2024-03-06 | End: 2024-03-06 | Stop reason: SDUPTHER

## 2024-03-06 RX ORDER — PHENAZOPYRIDINE HYDROCHLORIDE 100 MG/1
100 TABLET, FILM COATED ORAL
Qty: 9 TABLET | Refills: 0 | Status: SHIPPED | OUTPATIENT
Start: 2024-03-06 | End: 2024-03-09

## 2024-03-06 RX ORDER — SODIUM CHLORIDE 9 MG/ML
INJECTION, SOLUTION INTRAVENOUS PRN
Status: DISCONTINUED | OUTPATIENT
Start: 2024-03-06 | End: 2024-03-06 | Stop reason: HOSPADM

## 2024-03-06 RX ORDER — PHENYLEPHRINE HCL IN 0.9% NACL 1 MG/10 ML
SYRINGE (ML) INTRAVENOUS PRN
Status: DISCONTINUED | OUTPATIENT
Start: 2024-03-06 | End: 2024-03-06 | Stop reason: SDUPTHER

## 2024-03-06 RX ORDER — PROCHLORPERAZINE EDISYLATE 5 MG/ML
5 INJECTION INTRAMUSCULAR; INTRAVENOUS
Status: DISCONTINUED | OUTPATIENT
Start: 2024-03-06 | End: 2024-03-06 | Stop reason: HOSPADM

## 2024-03-06 RX ORDER — HYDROCODONE BITARTRATE AND ACETAMINOPHEN 5; 325 MG/1; MG/1
1 TABLET ORAL EVERY 6 HOURS PRN
Qty: 12 TABLET | Refills: 0 | Status: SHIPPED | OUTPATIENT
Start: 2024-03-06 | End: 2024-03-09

## 2024-03-06 RX ORDER — ONDANSETRON 2 MG/ML
INJECTION INTRAMUSCULAR; INTRAVENOUS PRN
Status: DISCONTINUED | OUTPATIENT
Start: 2024-03-06 | End: 2024-03-06 | Stop reason: SDUPTHER

## 2024-03-06 RX ORDER — FENTANYL CITRATE 50 UG/ML
25 INJECTION, SOLUTION INTRAMUSCULAR; INTRAVENOUS EVERY 5 MIN PRN
Status: DISCONTINUED | OUTPATIENT
Start: 2024-03-06 | End: 2024-03-06 | Stop reason: HOSPADM

## 2024-03-06 RX ORDER — PROPOFOL 10 MG/ML
INJECTION, EMULSION INTRAVENOUS PRN
Status: DISCONTINUED | OUTPATIENT
Start: 2024-03-06 | End: 2024-03-06 | Stop reason: SDUPTHER

## 2024-03-06 RX ORDER — LABETALOL HYDROCHLORIDE 5 MG/ML
10 INJECTION, SOLUTION INTRAVENOUS
Status: DISCONTINUED | OUTPATIENT
Start: 2024-03-06 | End: 2024-03-06 | Stop reason: HOSPADM

## 2024-03-06 RX ORDER — HYDROMORPHONE HYDROCHLORIDE 1 MG/ML
0.5 INJECTION, SOLUTION INTRAMUSCULAR; INTRAVENOUS; SUBCUTANEOUS EVERY 5 MIN PRN
Status: DISCONTINUED | OUTPATIENT
Start: 2024-03-06 | End: 2024-03-06 | Stop reason: HOSPADM

## 2024-03-06 RX ORDER — SODIUM CHLORIDE 0.9 % (FLUSH) 0.9 %
5-40 SYRINGE (ML) INJECTION PRN
Status: DISCONTINUED | OUTPATIENT
Start: 2024-03-06 | End: 2024-03-06 | Stop reason: HOSPADM

## 2024-03-06 RX ORDER — LEVOFLOXACIN 500 MG/1
500 TABLET, FILM COATED ORAL DAILY
Qty: 3 TABLET | Refills: 0 | Status: SHIPPED | OUTPATIENT
Start: 2024-03-06 | End: 2024-03-09

## 2024-03-06 RX ORDER — SODIUM CHLORIDE, SODIUM LACTATE, POTASSIUM CHLORIDE, CALCIUM CHLORIDE 600; 310; 30; 20 MG/100ML; MG/100ML; MG/100ML; MG/100ML
INJECTION, SOLUTION INTRAVENOUS CONTINUOUS
Status: DISCONTINUED | OUTPATIENT
Start: 2024-03-06 | End: 2024-03-06 | Stop reason: HOSPADM

## 2024-03-06 RX ADMIN — SODIUM CHLORIDE, SODIUM LACTATE, POTASSIUM CHLORIDE, AND CALCIUM CHLORIDE: 600; 310; 30; 20 INJECTION, SOLUTION INTRAVENOUS at 12:31

## 2024-03-06 RX ADMIN — Medication 10 MG: at 09:51

## 2024-03-06 RX ADMIN — LIDOCAINE HYDROCHLORIDE 100 MG: 20 INJECTION, SOLUTION EPIDURAL; INFILTRATION; INTRACAUDAL; PERINEURAL at 09:35

## 2024-03-06 RX ADMIN — ROCURONIUM BROMIDE 30 MG: 10 INJECTION, SOLUTION INTRAVENOUS at 09:38

## 2024-03-06 RX ADMIN — FENTANYL CITRATE 25 MCG: 50 INJECTION, SOLUTION INTRAMUSCULAR; INTRAVENOUS at 10:12

## 2024-03-06 RX ADMIN — DEXAMETHASONE SODIUM PHOSPHATE 4 MG: 4 INJECTION, SOLUTION INTRAMUSCULAR; INTRAVENOUS at 09:40

## 2024-03-06 RX ADMIN — SODIUM CHLORIDE, SODIUM LACTATE, POTASSIUM CHLORIDE, AND CALCIUM CHLORIDE: 600; 310; 30; 20 INJECTION, SOLUTION INTRAVENOUS at 08:08

## 2024-03-06 RX ADMIN — SODIUM CHLORIDE, SODIUM LACTATE, POTASSIUM CHLORIDE, AND CALCIUM CHLORIDE: 600; 310; 30; 20 INJECTION, SOLUTION INTRAVENOUS at 10:11

## 2024-03-06 RX ADMIN — ROCURONIUM BROMIDE 10 MG: 10 INJECTION, SOLUTION INTRAVENOUS at 09:44

## 2024-03-06 RX ADMIN — Medication 10 MG: at 10:17

## 2024-03-06 RX ADMIN — GLYCOPYRROLATE 0.2 MG: 0.2 INJECTION INTRAMUSCULAR; INTRAVENOUS at 10:16

## 2024-03-06 RX ADMIN — Medication 10 MG: at 09:46

## 2024-03-06 RX ADMIN — Medication 100 MCG: at 09:39

## 2024-03-06 RX ADMIN — Medication 100 MCG: at 09:56

## 2024-03-06 RX ADMIN — Medication 100 MCG: at 09:35

## 2024-03-06 RX ADMIN — MIDAZOLAM 2 MG: 1 INJECTION INTRAMUSCULAR; INTRAVENOUS at 09:23

## 2024-03-06 RX ADMIN — PROPOFOL 200 MG: 10 INJECTION, EMULSION INTRAVENOUS at 09:35

## 2024-03-06 RX ADMIN — SUGAMMADEX 200 MG: 100 INJECTION, SOLUTION INTRAVENOUS at 10:08

## 2024-03-06 RX ADMIN — Medication 100 MCG: at 09:40

## 2024-03-06 RX ADMIN — ONDANSETRON HYDROCHLORIDE 4 MG: 2 INJECTION INTRAMUSCULAR; INTRAVENOUS at 09:40

## 2024-03-06 RX ADMIN — Medication 10 MG: at 10:01

## 2024-03-06 RX ADMIN — FENTANYL CITRATE 25 MCG: 50 INJECTION, SOLUTION INTRAMUSCULAR; INTRAVENOUS at 10:11

## 2024-03-06 RX ADMIN — Medication 100 MCG: at 09:52

## 2024-03-06 RX ADMIN — GLYCOPYRROLATE 0.1 MG: 0.2 INJECTION INTRAMUSCULAR; INTRAVENOUS at 09:23

## 2024-03-06 RX ADMIN — FENTANYL CITRATE 50 MCG: 50 INJECTION, SOLUTION INTRAMUSCULAR; INTRAVENOUS at 09:26

## 2024-03-06 RX ADMIN — LEVOFLOXACIN 500 MG: 5 INJECTION, SOLUTION INTRAVENOUS at 09:23

## 2024-03-06 RX ADMIN — Medication 10 MG: at 10:16

## 2024-03-06 RX ADMIN — Medication 100 MCG: at 09:43

## 2024-03-06 ASSESSMENT — PAIN SCALES - GENERAL
PAINLEVEL_OUTOF10: 0

## 2024-03-06 ASSESSMENT — COPD QUESTIONNAIRES: CAT_SEVERITY: MODERATE

## 2024-03-06 ASSESSMENT — PAIN - FUNCTIONAL ASSESSMENT: PAIN_FUNCTIONAL_ASSESSMENT: NONE - DENIES PAIN

## 2024-03-06 NOTE — DISCHARGE INSTRUCTIONS
[facility], Urology     [facility], [address], [address], [place] [address]  Office: [phone]  Fax:    [phone]    PROCEDURE   Aqua Ablation of prostate  __  Notify [facility] IMMEDIATELY if any of the following occur:    You are unable to urinate.  Urgency to urinate is not uncommon.   You find yourself urinating small frequent amounts associated with severe lower abdominal discomfort.   Bright red blood with clots in the urine. Some reddish urine is not uncommon and should be treated with increasing the amount of fluids you drink.   Temperature above 101.5° and / or chills.   You are nauseous and / or vomiting and you cannot hold down any fluids.   Your pain is not controlled with the pain medication prescribed.    Special Considerations:     Do not drive for at least 24-48 hours after the procedure and until you are no longer taking narcotic pain medication and you are able to move and react without hesitation.    _x_  No heavy lifting over 20 pounds & no strenuous exercise for 1  week   __x  Other instructions:  Drink about 2 liters of water a day.   _x_  Our office will call you to make an appointment next week to remove your martinez catheter.     Please contact [facility] at [phone] or go to the nearest Emergency Department / Urgent Care facility for any other medical questions or concerns.         DISCHARGE SUMMARY from Nurse    PATIENT INSTRUCTIONS:    After general anesthesia or intravenous sedation, for 24 hours or while taking prescription Narcotics:  Limit your activities  Do not drive and operate hazardous machinery  Do not make important personal or business decisions  Do  not drink alcoholic beverages  If you have not urinated within 8 hours after discharge, please contact your surgeon on call.    Report the following to your surgeon:  Excessive pain, swelling, redness or odor of or around the surgical area  Temperature over

## 2024-03-06 NOTE — PERIOP NOTE
Dr. Doyle at bedside to assess patient. To continue CBI and monitoring patient's urine color. Dr. Doyle will come back and assess patient after next case.

## 2024-03-06 NOTE — PERIOP NOTE
Dr. Doyle at bedside to assess patient. To remove traction and monitor urine color. Dr. Doyle to return to bedside in 15 minutes.

## 2024-03-06 NOTE — PERIOP NOTE
TRANSFER - IN REPORT:    Verbal report received from RENETTA Cunha RN on Yasmani BARRIOS Law  being received from PACU for routine post-op      Report consisted of patient's Situation, Background, Assessment and   Recommendations(SBAR).     Information from the following report(s) Nurse Handoff Report, Adult Overview, Surgery Report, Intake/Output, and MAR was reviewed with the receiving nurse.    Opportunity for questions and clarification was provided.      Assessment completed upon patient's arrival to unit and care assumed.

## 2024-03-06 NOTE — H&P
No bladder tumor recurrence.  Patient with capacious bladder and trabeculation indicative of significant bladder outlet obstruction.  Urine cytology sent today.    3.  Urinary retention   Associated symptoms include dysuria, fever, hematuria, incomplete emptying, pelvic pressure, slow stream, dribbling and urinary straining. Additional information: Patient is s/p BCG 10/11/23.  He reports this morning he started having difficulty urinating dysuria, and lower back pain.  He has only been dribbling.  He also reports a fever of 102.  PVR today >299 mL..  UA nitrite positive.  Will send for culture and empirically treat with levofloxacin 750 mg daily x7 days.  16Fr Gay coude catheter placed with 400 mL of output.  Will have him return in 7 days for voiding trial.    4.  BPH   1/29/2024-urinary retention failed voiding trial.  Office cystoscopy had shown trilobar prostatic hypertrophy with minimal median lobe.  Patient underwent TRUS today which shows patient to have a prostate volume 52 cc.  We had discussed intervention for bladder outlet procedure.  He had failed voiding trial.  His IPSS score 35, bother 5/6.  Discussed risks and benefits of intervention.  Patient wishes to proceed with intervention.  Options reviewed patient wishes to elected to proceed with Aqua ablation of the prostate.  Risks benefits discussed in detail and he understands.  Gay catheter discussed.  Staged procedure discussed.  Blood in the urine discussed.  Irritative urinary symptoms lasting 6-8 weeks also discussed.  Patient understands wishes to proceed.  Wife present for discussion.      Past Medical/Surgical History   (Reviewed, updated)  Disease/disorder  Onset Date  Management  Date  Comments  Thyroid disease          urinary complications          hernia              nasal 1971          hernia 1994          Problem List  Problem List reviewed.   Problem Description  Onset Date  Chronic  Clinical Status  Notes  Hashimoto's

## 2024-03-06 NOTE — PERIOP NOTE
TRANSFER - IN REPORT:    Verbal report received from OR Nurse and CRNA on Yasmani C Law  being received from OR for routine post-op      Report consisted of patient's Situation, Background, Assessment and   Recommendations(SBAR).     Information from the following report(s) Nurse Handoff Report, Adult Overview, Surgery Report, Intake/Output, MAR, and Recent Results was reviewed with the receiving nurse.    Opportunity for questions and clarification was provided.      Assessment completed upon patient's arrival to unit and care assumed.

## 2024-03-06 NOTE — ANESTHESIA POSTPROCEDURE EVALUATION
Department of Anesthesiology  Postprocedure Note    Patient: Yasmani Baum  MRN: [id number]  YOB: 1945  Date of evaluation: 3/6/2024    Procedure Summary       Date: 03/06/24 Room / Location: [facility] MAIN CYSTO / [facility] MAIN OR    Anesthesia Start: 0923 Anesthesia Stop: 1027    Procedure: AQUABLATION OP 23 (SPEC POP) (Bladder) Diagnosis:       Benign localized prostatic hyperplasia with lower urinary tract symptoms (LUTS)      (Benign localized prostatic hyperplasia with lower urinary tract symptoms (LUTS) [N40.1])    Surgeons: Doris Doyle MD Responsible Provider: Javier Gonzalez DO    Anesthesia Type: General ASA Status: 3            Anesthesia Type: General    Anshul Phase I: Anshul Score: 10    Anshul Phase II: Anshul Score: 10    Anesthesia Post Evaluation    Patient location during evaluation: PACU  Patient participation: complete - patient participated  Level of consciousness: awake and alert  Pain score: 0  Airway patency: patent  Nausea & Vomiting: no nausea and no vomiting  Cardiovascular status: blood pressure returned to baseline  Respiratory status: acceptable  Hydration status: euvolemic  Multimodal analgesia pain management approach  Pain management: adequate    No notable events documented.

## 2024-03-06 NOTE — ANESTHESIA PRE PROCEDURE
Department of Anesthesiology  Preprocedure Note       Name:  Yasmani Baum   Age:  78 y.o.  :  1945                                          MRN:  [id number]         Date:  3/6/2024      Surgeon: Surgeon(s):  Doris Doyle MD    Procedure: Procedure(s):  AQUABLATION OP 23 (SPEC POP)    Medications prior to admission:   Prior to Admission medications    Medication Sig Start Date End Date Taking? Authorizing Provider   carvedilol (COREG) 25 MG tablet Take 1 tablet by mouth 2 times daily (with meals) Indications: Heart    Claire Ulrich MD   fluticasone (FLONASE) 50 MCG/ACT nasal spray 2 sprays by Each Nostril route every evening Indications: Allergies    Claire Ulrich MD   acetaminophen (TYLENOL) 500 MG tablet Take 2 tablets by mouth every 6 hours as needed for Pain    Claire Ulrich MD   amLODIPine (NORVASC) 5 MG tablet Take 1 tablet by mouth nightly Indications: HTN    Claire Ulrich MD   ALPRAZolam (XANAX) 0.25 MG tablet Take 1 tablet by mouth daily as needed for Anxiety.  Patient not taking: Reported on 3/6/2024    Claire Ulrich MD   zolpidem (AMBIEN) 5 MG tablet Take 1 tablet by mouth nightly as needed for Sleep.  Patient not taking: Reported on 3/6/2024    Claire Ulrich MD   levothyroxine (SYNTHROID) 125 MCG tablet Take 1 tablet by mouth every morning Indications: hashimoto    Claire Ulrich MD   losartan (COZAAR) 50 MG tablet Take 2 tablets by mouth every morning Indications: HTN    Claire Ulrich MD   fluticasone-salmeterol (ADVAIR) 500-50 MCG/ACT AEPB diskus inhaler Inhale 1 puff into the lungs in the morning and 1 puff in the evening. Indications: COPD.    Claire Ulrich MD   traMADol (ULTRAM) 50 MG tablet Take 1 tablet by mouth 2 times daily as needed for Pain.    Claire Ulrich MD   tamsulosin (FLOMAX) 0.4 MG capsule Take 1 capsule by mouth every evening After dinnner    Claire Ulrich MD       Current medications:

## 2024-03-06 NOTE — PERIOP NOTE
Reviewed PTA medication list with patient/caregiver and patient/caregiver denies any additional medications.     Patient admits to having a responsible adult care for them at home for at least 24 hours after surgery.    Patient encouraged to use gown warming system and informed that using said warming gown to regulate body temperature prior to a procedure has been shown to help reduce the risks of blood clots and infection.    Patient's pharmacy of choice verified and documented in PTA medication section.

## 2024-03-06 NOTE — OP NOTE
Operative Note      Patient: Yasmani Baum  YOB: 1945  MRN: [id number]    Date of Procedure: 3/6/2024    Pre-Op Diagnosis Codes:     * Benign localized prostatic hyperplasia with lower urinary tract symptoms (LUTS) [N40.1]    Post-Op Diagnosis: Post-Op Diagnosis Codes:     * Benign localized prostatic hyperplasia with lower urinary tract symptoms (LUTS) [N40.1]       Procedure(s):  AQUABLATION OP 23 (SPEC POP)    Surgeon(s):  Doris Doyle MD    Assistant:   Surgical Assistant: Cyndi Golden    Anesthesia: General    Estimated Blood Loss (mL): less than 50     Complications: None    Specimens:   ID Type Source Tests Collected by Time Destination   A : prostate chips Tissue Prostate SURGICAL PATHOLOGY Doris Doyle MD 3/6/2024 1019        Implants:  * No implants in log *      Drains:   Urinary Catheter 03/06/24 3 Way (Active)   Catheter Indications Perioperative use for selected surgical procedures 03/06/24 1023   Urine Color Pink 03/06/24 1023   Urine Appearance Clear 03/06/24 1023   Collection Container Standard 03/06/24 1023   Securement Method Leg strap 03/06/24 1023   Catheter Best Practices  Catheter secured to thigh;Tamper seal intact;Bag below bladder;Bag not on floor;Lack of dependent loop in tubing;Drainage bag less than half full 03/06/24 1023   Status Continuous bladder irrigation 03/06/24 1023   Rate Moderate 03/06/24 1023   Irrigant Normal saline 03/06/24 1023       Findings: Obstructive lateral prostate lobe.         Detailed Description of Procedure:   After informed consent was obtained, the patient was taken to the operating room, and he underwent laryngeal mask anesthesia in the supine position. The patient is given antibiotics prophylaxis, and general anesthesia with paralysis is induced.  The [employer] system ("[employer] [facility] [place], [place]) was used. The system includes three main components: robotic armpiece, console, and the conformal planning unit

## 2024-03-06 NOTE — PERIOP NOTE
TRANSFER - OUT REPORT:    Verbal report given to Kathi RICARDO on Yasmani Baum  being transferred to Phase 2 for routine post-op       Report consisted of patient's Situation, Background, Assessment and   Recommendations(SBAR).     Information from the following report(s) Nurse Handoff Report, Adult Overview, Surgery Report, Intake/Output, MAR, and Recent Results was reviewed with the receiving nurse.           Lines:   Peripheral IV 03/06/24 Posterior;Right Hand (Active)   Site Assessment Clean, dry & intact 03/06/24 1305   Line Status Infusing 03/06/24 1305   Phlebitis Assessment No symptoms 03/06/24 1305   Infiltration Assessment 0 03/06/24 1305   Dressing Status Clean, dry & intact 03/06/24 1305   Dressing Type Transparent 03/06/24 1305        Opportunity for questions and clarification was provided.      Patient transported with:  Registered Nurse

## 2024-09-17 ENCOUNTER — HOSPITAL ENCOUNTER (OUTPATIENT)
Age: 79
Discharge: HOME | End: 2024-09-20
Payer: MEDICARE

## 2024-09-17 DIAGNOSIS — J44.9 CHRONIC OBSTRUCTIVE PULMONARY DISEASE, UNSPECIFIED COPD TYPE (HCC): ICD-10-CM

## 2024-09-17 PROCEDURE — 94618 PULMONARY STRESS TESTING: CPT

## (undated) DEVICE — DEVICE SECUREMENT 1/32IN POLYETH FOAM F ANCHR URIN CATH

## (undated) DEVICE — SOLUTION IRRIG 3000ML 0.9% SOD CHL FLX CONT 0797208] ICU MEDICAL INC]

## (undated) DEVICE — DRAINBAG,ANTI-REFLUX TOWER,L/F,2000ML,LL: Brand: MEDLINE

## (undated) DEVICE — LINER,SEMI-RIGID,3000CC,50EA/CS: Brand: MEDLINE

## (undated) DEVICE — SYRINGE,TOOMEY,IRRIGATION,70CC,STERILE: Brand: MEDLINE

## (undated) DEVICE — BAG  LEG  EX-TUBING  18IN  MEDIUM  20OZ

## (undated) DEVICE — [FOUR SPIKE IRRIGATOR SET,  NON-PYROGENIC FLUID PATH,  DO NOT USE IF PACKAGE IS DAMAGED]

## (undated) DEVICE — SOLUTION IRRIG 3000ML 0.9% SOD CHL USP UROMATIC PLAS CONT

## (undated) DEVICE — TOWEL SURG W16XL26IN BLU NONFENESTRATED DLX ST 2 PER PK

## (undated) DEVICE — SYRINGE MED 30ML STD CLR PLAS LUERLOCK TIP N CTRL DISP

## (undated) DEVICE — SOLUTION IRRIG 1500ML STRL H2O AQUALITE PLAS POUR BTL

## (undated) DEVICE — GEL,ULTRASOUND,BOTTLE,8.5-OZ,CLEAR: Brand: MEDLINE INDUSTRIES, INC.

## (undated) DEVICE — STRAP,POSITIONING,KNEE/BODY,FOAM,4X60": Brand: MEDLINE

## (undated) DEVICE — Device: Brand: AQUABEAM HANDPIECE

## (undated) DEVICE — GARMENT,MEDLINE,DVT,INT,CALF,MED, GEN2: Brand: MEDLINE

## (undated) DEVICE — CUTTING LOOP, BIPOLAR, 24/26 FR.: Brand: N.A.

## (undated) DEVICE — PACK DRAPE AQUA ABLATION

## (undated) DEVICE — PLUG CATH TAPR GRP HNDL CAP

## (undated) DEVICE — INFUSION SET W CK VLV AND 2 SMRTSITE NEEDLE FREE VLV PORTS

## (undated) DEVICE — TUBING, SUCTION, 1/4" X 12', STRAIGHT: Brand: MEDLINE

## (undated) DEVICE — STERILE POLYISOPRENE POWDER-FREE SURGICAL GLOVES: Brand: PROTEXIS

## (undated) DEVICE — GLOVE ORANGE PI 7 1/2   MSG9075

## (undated) DEVICE — CATHETER FOL 3 W 24 FR 30 CC URETH SPEC COUVELAIRE BARDX IC

## (undated) DEVICE — SYRINGE CATH TIP 50ML

## (undated) DEVICE — CATHETER URETH 18FR BLLN 5CC SIL HYDRGEL 2 W F SPEC CARS M

## (undated) DEVICE — FOUR LEAD ARTHROSCOPIC IRRIGATION SET

## (undated) DEVICE — CYSTO PACK: Brand: MEDLINE INDUSTRIES, INC.

## (undated) DEVICE — PAD,NON-ADHERENT,3X8,STERILE,LF,1/PK: Brand: MEDLINE

## (undated) DEVICE — UTILITY MARKER,BLACK WITH LABELS: Brand: DEVON

## (undated) DEVICE — MAYO STAND COVER: Brand: CONVERTORS

## (undated) DEVICE — SYRINGE MED 10ML LUERLOCK TIP W/O SFTY DISP